# Patient Record
Sex: MALE | Race: WHITE
[De-identification: names, ages, dates, MRNs, and addresses within clinical notes are randomized per-mention and may not be internally consistent; named-entity substitution may affect disease eponyms.]

---

## 2020-11-04 ENCOUNTER — HOSPITAL ENCOUNTER (OUTPATIENT)
Dept: HOSPITAL 92 - BICRAD | Age: 30
Discharge: HOME | End: 2020-11-04
Attending: NURSE PRACTITIONER
Payer: COMMERCIAL

## 2020-11-04 DIAGNOSIS — S49.92XA: Primary | ICD-10-CM

## 2020-11-04 NOTE — RAD
LEFT SHOULDER 3 VIEWS:

 

Date:  11/04/2020

 

HISTORY:  

Injury, left shoulder pain. 

 

FINDINGS/IMPRESSION: 

No acute fracture or dislocation is identified. 

 

 

POS: [facility]

## 2020-12-10 ENCOUNTER — HOSPITAL ENCOUNTER (OUTPATIENT)
Dept: HOSPITAL 92 - TBSIIMAG | Age: 30
Discharge: HOME | End: 2020-12-10
Attending: NURSE PRACTITIONER
Payer: COMMERCIAL

## 2020-12-10 DIAGNOSIS — S49.92XD: Primary | ICD-10-CM

## 2020-12-10 NOTE — MRI
MRI OF LEFT SHOULDER PERFORMED WITHOUT CONTRAST ENHANCEMENT:

 

Date:  12/10/2020

 

HISTORY:  

Left shoulder injury in [employer]. 

 

COMPARISON:  

11/04/2020 plain film examination. 

 

FINDINGS:

There is some edema change and irregularity to the distal end of the clavicle. The acromion has a nor
mal signal change. This could represent some osteolysis or could be a sequelae of a fracture. I do no
t appreciate any abnormality on the previous plain film examination. 

 

The supra as well as infraspinatus tendons are intact. 

 

Subscapularis muscle and tendon are intact. The biceps tendon is normal in position within the bicipi
earl groove. 

 

Bicipital labral complex appears unremarkable. 

 

Inferior glenohumeral ligamentous and labral complex is also normal in appearance. 

 

No rotator cuff muscle atrophy. 

 

IMPRESSION: 

1.  Edema changes of the distal end of the clavicle with some edema change at the AC joint level, but
 no involving the acromion. Slight irregularity to the distal end of the clavicle would suggest that 
this represents some osteolysis or a subtle nondisplaced fracture. Clinical correlation as to history
 of injury. There is no AC joint dislocation. 

 

2.  No evidence of rotator cuff or labral injury. 

 

 

POS: [facility]